# Patient Record
Sex: MALE | Race: WHITE | Employment: STUDENT | URBAN - METROPOLITAN AREA
[De-identification: names, ages, dates, MRNs, and addresses within clinical notes are randomized per-mention and may not be internally consistent; named-entity substitution may affect disease eponyms.]

---

## 2019-01-10 ENCOUNTER — OFFICE VISIT (OUTPATIENT)
Dept: FAMILY MEDICINE CLINIC | Facility: CLINIC | Age: 19
End: 2019-01-10

## 2019-01-10 VITALS
HEIGHT: 74 IN | OXYGEN SATURATION: 97 % | TEMPERATURE: 99 F | WEIGHT: 197 LBS | BODY MASS INDEX: 25.28 KG/M2 | SYSTOLIC BLOOD PRESSURE: 131 MMHG | HEART RATE: 75 BPM | RESPIRATION RATE: 16 BRPM | DIASTOLIC BLOOD PRESSURE: 59 MMHG

## 2019-01-10 DIAGNOSIS — Z02.5 SPORTS PHYSICAL: Primary | ICD-10-CM

## 2019-01-10 PROCEDURE — 99385 PREV VISIT NEW AGE 18-39: CPT | Performed by: PHYSICIAN ASSISTANT

## 2019-01-10 NOTE — PROGRESS NOTES
CHIEF COMPLAINT:   Patient presents with:  Sports Physical       HPI:   Mk Swartz is a 22year old male who presents for a sports physical exam. Patient will be participating in baseball at [employer] . He is in his 1st year at [employer] – [place], [place].       Patient did jefferson a no dysuria, urgency or frequency; no hernias  MUSCULOSKELETAL: no joint complaints upper or lower extremities. Denies previous sports related injury. NEURO: no sensory or motor complaint. Denies history of concussion.    PSYCHE: no symptoms of depression hernias. ASSESSMENT AND PLAN:     Soo Doe is a 22year old male who presents for a sports physical exam.     Patient is cleared for sports without restrictions. Form filled out and given to patient/parent.   Copy of form sent to be scanned into p

## 2020-01-14 ENCOUNTER — OFFICE VISIT (OUTPATIENT)
Dept: FAMILY MEDICINE CLINIC | Facility: CLINIC | Age: 20
End: 2020-01-14

## 2020-01-14 VITALS
HEART RATE: 90 BPM | BODY MASS INDEX: 24.38 KG/M2 | SYSTOLIC BLOOD PRESSURE: 128 MMHG | WEIGHT: 182 LBS | HEIGHT: 72.5 IN | DIASTOLIC BLOOD PRESSURE: 84 MMHG | RESPIRATION RATE: 18 BRPM

## 2020-01-14 DIAGNOSIS — Z02.5 SPORTS PHYSICAL: Primary | ICD-10-CM

## 2020-01-14 PROCEDURE — 99395 PREV VISIT EST AGE 18-39: CPT | Performed by: PHYSICIAN ASSISTANT

## 2020-01-14 RX ORDER — PREDNISONE 20 MG/1
TABLET ORAL
COMMUNITY
Start: 2020-01-08 | End: 2020-02-09

## 2020-01-14 RX ORDER — ISOTRETINOIN 20 MG/1
20 CAPSULE ORAL
COMMUNITY
Start: 2020-01-08 | End: 2020-02-07

## 2020-01-14 NOTE — PROGRESS NOTES
CHIEF COMPLAINT:   Patient presents with:  Sports Physical: [employer] baseball       HPI:   Carmen Elizabeth is a 20year old male accompanied who presents for a sports physical exam. Patient will be participating in baseball.   Patient attends school at [employer] Currently       REVIEW OF SYSTEMS:   GENERAL HEALTH: feels well, no fatigue. SKIN: + severe acne-- recently started accutane.  Denies history of MRSA  EYES: no visual complaints, no deficits   HEENT: denies nasal congestion, sinus pain or sore throat, or h nontender, no curvature appreciated and no leg length discrepancy noted. Lymphadenopathy: No cervical or supraclavicular adenopathy. Neuro: Alert and oriented to person, place, and time. Triceps, brachioradialis and patella DTRs are +2/4 and symmetric.